# Patient Record
Sex: FEMALE | Race: WHITE | Employment: OTHER | URBAN - METROPOLITAN AREA
[De-identification: names, ages, dates, MRNs, and addresses within clinical notes are randomized per-mention and may not be internally consistent; named-entity substitution may affect disease eponyms.]

---

## 2022-10-29 ENCOUNTER — APPOINTMENT (OUTPATIENT)
Dept: CT IMAGING | Age: 82
End: 2022-10-29
Payer: COMMERCIAL

## 2022-10-29 ENCOUNTER — HOSPITAL ENCOUNTER (EMERGENCY)
Age: 82
Discharge: HOME | End: 2022-10-29
Payer: COMMERCIAL

## 2022-10-29 VITALS
HEIGHT: 66 IN | BODY MASS INDEX: 26.52 KG/M2 | WEIGHT: 165 LBS | OXYGEN SATURATION: 97 % | TEMPERATURE: 98.9 F | DIASTOLIC BLOOD PRESSURE: 78 MMHG | RESPIRATION RATE: 16 BRPM | HEART RATE: 74 BPM | SYSTOLIC BLOOD PRESSURE: 120 MMHG

## 2022-10-29 DIAGNOSIS — S01.81XA FACIAL LACERATION, INITIAL ENCOUNTER: Primary | ICD-10-CM

## 2022-10-29 DIAGNOSIS — S09.90XA INJURY OF HEAD, INITIAL ENCOUNTER: ICD-10-CM

## 2022-10-29 PROCEDURE — 99284 EMERGENCY DEPT VISIT MOD MDM: CPT

## 2022-10-29 PROCEDURE — 6360000002 HC RX W HCPCS

## 2022-10-29 PROCEDURE — 90714 TD VACC NO PRESV 7 YRS+ IM: CPT | Performed by: NURSE PRACTITIONER

## 2022-10-29 PROCEDURE — 12013 RPR F/E/E/N/L/M 2.6-5.0 CM: CPT

## 2022-10-29 PROCEDURE — 70450 CT HEAD/BRAIN W/O DYE: CPT

## 2022-10-29 PROCEDURE — 70486 CT MAXILLOFACIAL W/O DYE: CPT

## 2022-10-29 PROCEDURE — 72125 CT NECK SPINE W/O DYE: CPT

## 2022-10-29 PROCEDURE — 6360000002 HC RX W HCPCS: Performed by: NURSE PRACTITIONER

## 2022-10-29 PROCEDURE — 96372 THER/PROPH/DIAG INJ SC/IM: CPT

## 2022-10-29 PROCEDURE — 90471 IMMUNIZATION ADMIN: CPT | Performed by: NURSE PRACTITIONER

## 2022-10-29 RX ORDER — LIDOCAINE HYDROCHLORIDE 10 MG/ML
INJECTION, SOLUTION EPIDURAL; INFILTRATION; INTRACAUDAL; PERINEURAL
Status: DISCONTINUED
Start: 2022-10-29 | End: 2022-10-30 | Stop reason: HOSPADM

## 2022-10-29 RX ORDER — DIPHENHYDRAMINE HYDROCHLORIDE 50 MG/ML
25 INJECTION INTRAMUSCULAR; INTRAVENOUS ONCE
Status: COMPLETED | OUTPATIENT
Start: 2022-10-29 | End: 2022-10-29

## 2022-10-29 RX ORDER — DIPHENHYDRAMINE HYDROCHLORIDE 50 MG/ML
INJECTION INTRAMUSCULAR; INTRAVENOUS
Status: COMPLETED
Start: 2022-10-29 | End: 2022-10-29

## 2022-10-29 RX ADMIN — CLOSTRIDIUM TETANI TOXOID ANTIGEN (FORMALDEHYDE INACTIVATED) AND CORYNEBACTERIUM DIPHTHERIAE TOXOID ANTIGEN (FORMALDEHYDE INACTIVATED) 0.5 ML: 5; 2 INJECTION, SUSPENSION INTRAMUSCULAR at 20:01

## 2022-10-29 RX ADMIN — DIPHENHYDRAMINE HYDROCHLORIDE 25 MG: 50 INJECTION INTRAMUSCULAR; INTRAVENOUS at 20:03

## 2022-10-29 RX ADMIN — DIPHENHYDRAMINE HYDROCHLORIDE 25 MG: 50 INJECTION, SOLUTION INTRAMUSCULAR; INTRAVENOUS at 20:03

## 2022-10-29 SDOH — ECONOMIC STABILITY: FOOD INSECURITY: WITHIN THE PAST 12 MONTHS, THE FOOD YOU BOUGHT JUST DIDN'T LAST AND YOU DIDN'T HAVE MONEY TO GET MORE.: NEVER TRUE

## 2022-10-29 ASSESSMENT — PAIN SCALES - GENERAL: PAINLEVEL_OUTOF10: 8

## 2022-10-29 ASSESSMENT — PAIN - FUNCTIONAL ASSESSMENT
PAIN_FUNCTIONAL_ASSESSMENT: 0-10
PAIN_FUNCTIONAL_ASSESSMENT: NONE - DENIES PAIN

## 2022-10-29 ASSESSMENT — PAIN DESCRIPTION - LOCATION: LOCATION: HEAD

## 2022-10-29 NOTE — ED PROVIDER NOTES
[facility]        Pt Name: Catia Garg  MRN: [id number]  [place] 1940  Date of evaluation: 10/29/2022  Provider: ARIAN Lacey - ISIDORO  PCP: Fabi Fonseca MD  Note Started: 7:55 PM EDT       [facility]. I have evaluated this patient. My supervising physician was available for consultation. CHIEF COMPLAINT       Chief Complaint   Patient presents with    Fall     Pt arrived via [address] [place] [address] Way home w c/o fall that took place a hour ago. Pt fell in a dining area @ hit her head. Laceration at L eyebrow, R hand       HISTORY OF PRESENT ILLNESS   (Location, Timing/Onset, Context/Setting, Quality, Duration, Modifying Factors, Severity, Associated Signs and Symptoms)  Note limiting factors. Chief Complaint: fall     Catia Garg is a 78 y.o. female who presents to the emergency department for evaluation of fall. The patient is from an [facility] unit and had a mechanical fall. She does have a laceration over the left eye. Skin tears to bilateral forearms. She denies complaint. She is at baseline. She was in the dining area when she fell. She is unable to verbalize the events. Nursing Notes were all reviewed and agreed with or any disagreements were addressed in the HPI. REVIEW OF SYSTEMS    (2-9 systems for level 4, 10 or more for level 5)     Review of Systems   Unable to perform ROS: Dementia   Skin:  Positive for wound. Positives and Pertinent negatives as per HPI. Except as noted above in the ROS, all other systems were reviewed and negative.        PAST MEDICAL HISTORY     Past Medical History:   Diagnosis Date    Acid reflux     Anemia     Bladder prolapse, congenital     Cognitive impairment     Fibromyalgia     Muscle disease or syndrome     Sinusitis, chronic          SURGICAL HISTORY     Past Surgical History:   Procedure Laterality Date COLONOSCOPY  4/17/13    HYSTERECTOMY (CERVIX STATUS UNKNOWN)      SINUS SURGERY      UPPER GASTROINTESTINAL ENDOSCOPY  4/17/13         CURRENTMEDICATIONS       Previous Medications    ACETAMINOPHEN (TYLENOL 8 HOUR PO)    Take  by mouth. BUPROPION (WELLBUTRIN SR) 150 MG EXTENDED RELEASE TABLET    Take 1 tablet by mouth 2 times daily    CLOTRIMAZOLE-BETAMETHASONE (LOTRISONE) 1-0.05 % CREAM    APPLY TO AFFECTED AREA(S) TWICE DAILY    DONEPEZIL (ARICEPT) 10 MG TABLET    Take 1 tablet by mouth nightly    FLUTICASONE (FLONASE) 50 MCG/ACT NASAL SPRAY    2 sprays by Nasal route daily    FLUTICASONE (FLONASE) 50 MCG/ACT NASAL SPRAY    2 sprays by Nasal route daily    LISINOPRIL-HYDROCHLOROTHIAZIDE (PRINZIDE;ZESTORETIC) 10-12.5 MG PER TABLET    Take 1 tablet by mouth daily    PANTOPRAZOLE (PROTONIX) 40 MG TABLET    TAKE ONE TABLET BY MOUTH ONCE A DAY         ALLERGIES     Ceclor [cefaclor] and Erythromycin    FAMILYHISTORY       Family History   Problem Relation Age of Onset    Cancer Mother     Heart Disease Mother     Cancer Father     Heart Disease Father           SOCIAL HISTORY       Social History     Tobacco Use    Smoking status: Never    Smokeless tobacco: Never   Substance Use Topics    Alcohol use: No    Drug use: No       SCREENINGS    Newcomb Coma Scale  Eye Opening: Spontaneous  Best Verbal Response: Confused  Best Motor Response: Obeys commands  Newcomb Coma Scale Score: 14        PHYSICAL EXAM    (up to 7 for level 4, 8 or more for level 5)     ED Triage Vitals [10/29/22 1935]   BP Temp Temp Source Heart Rate Resp SpO2 Height Weight   120/78 98.9 °F (37.2 °C) Oral 74 16 97 % 5' 6\" (1.676 m) 165 lb (74.8 kg)       Physical Exam  Vitals and nursing note reviewed. Constitutional:       Appearance: She is well-developed. She is not diaphoretic. HENT:      Head: Normocephalic. Right Ear: External ear normal.      Left Ear: External ear normal.   Eyes:      General:         Right eye: No discharge. Left eye: No discharge. Neck:      Vascular: No JVD. Cardiovascular:      Rate and Rhythm: Normal rate. Pulmonary:      Effort: Pulmonary effort is normal. No respiratory distress. Abdominal:      Palpations: Abdomen is soft. Musculoskeletal:         General: Normal range of motion. Skin:     General: Skin is warm and dry. Coloration: Skin is not pale. Neurological:      Mental Status: She is alert. Mental status is at baseline. Psychiatric:         Behavior: Behavior normal.       DIAGNOSTIC RESULTS   LABS:    Labs Reviewed - No data to display    When ordered only abnormal lab results are displayed. All other labs were within normal range or not returned as of this dictation. EKG: When ordered, EKG's are interpreted by the Emergency Department Physician in the absence of a cardiologist.  Please see their note for interpretation of EKG. RADIOLOGY:   Non-plain film images such as CT, Ultrasound and MRI are read by the radiologist. Plain radiographic images are visualized and preliminarily interpreted by the ED Provider with the below findings:        Interpretation per the Radiologist below, if available at the time of this note:    CT CERVICAL SPINE WO CONTRAST   Preliminary Result   1. No acute intracranial abnormality. Global atrophy with chronic   periventricular white matter ischemic changes. 2. Left frontal and supraorbital soft tissue swelling. No associated facial   fracture or calvarial abnormality. 3. No evidence of cervical spine fracture. Grade 1 anterolisthesis of C4 on   C5, suspected to be chronic given associated degenerative change. If   clinical concern persists or if patient has persistent neck pain, consider   flexion-extension views. CT HEAD WO CONTRAST   Preliminary Result   1. No acute intracranial abnormality. Global atrophy with chronic   periventricular white matter ischemic changes.    2. Left frontal and supraorbital soft tissue swelling. No associated facial   fracture or calvarial abnormality. 3. No evidence of cervical spine fracture. Grade 1 anterolisthesis of C4 on   C5, suspected to be chronic given associated degenerative change. If   clinical concern persists or if patient has persistent neck pain, consider   flexion-extension views. CT FACIAL BONES WO CONTRAST   Preliminary Result   1. No acute intracranial abnormality. Global atrophy with chronic   periventricular white matter ischemic changes. 2. Left frontal and supraorbital soft tissue swelling. No associated facial   fracture or calvarial abnormality. 3. No evidence of cervical spine fracture. Grade 1 anterolisthesis of C4 on   C5, suspected to be chronic given associated degenerative change. If   clinical concern persists or if patient has persistent neck pain, consider   flexion-extension views. No results found.         PROCEDURES   Unless otherwise noted below, none     Lac Repair    Date/Time: 10/29/2022 7:59 PM  Performed by: ARIAN Keith CNP  Authorized by: ARIAN Keith CNP     Consent:     Consent obtained:  Verbal  Universal protocol:     Procedure explained and questions answered to patient or proxy's satisfaction: yes      Patient identity confirmed:  Verbally with patient  Anesthesia:     Anesthesia method:  Local infiltration    Local anesthetic:  Lidocaine 2% w/o epi  Laceration details:     Location:  Face    Face location:  L eyebrow    Length (cm):  3  Exploration:     Wound exploration: wound explored through full range of motion      Contaminated: no    Treatment:     Area cleansed with:  Chlorhexidine    Amount of cleaning:  Standard    Irrigation solution:  Sterile saline    Visualized foreign bodies/material removed: no    Skin repair:     Repair method:  Sutures    Suture size:  5-0    Suture material:  Nylon    Suture technique:  Simple interrupted    Number of sutures:  4  Approximation: Approximation:  Close  Repair type:     Repair type:  Simple  Post-procedure details:     Dressing:  Open (no dressing)    Procedure completion:  Tolerated    CRITICAL CARE TIME       CONSULTS:  None      EMERGENCY DEPARTMENT COURSE and DIFFERENTIAL DIAGNOSIS/MDM:   Vitals:    Vitals:    10/29/22 1935   BP: 120/78   Pulse: 74   Resp: 16   Temp: 98.9 °F (37.2 °C)   TempSrc: Oral   SpO2: 97%   Weight: 165 lb (74.8 kg)   Height: 5' 6\" (1.676 m)       Patient was given the following medications:  Medications   lidocaine PF 1 % injection (has no administration in time range)   tetanus & diphtheria toxoids (adult) 5-2 LFU injection 0.5 mL (0.5 mLs IntraMUSCular Given 10/29/22 2001)   diphenhydrAMINE (BENADRYL) injection 25 mg (25 mg IntraMUSCular Given 10/29/22 2003)         Is this patient to be included in the SEP-1 Core Measure due to severe sepsis or septic shock? No   Exclusion criteria - the patient is NOT to be included for SEP-1 Core Measure due to: Infection is not suspected    Briefly, this is an 68-year-old patient with history of dementia that presents to the emergency department for evaluation following fall. Laceration was easily repaired by myself. She was given 25 mg of IM Benadryl as she continued to climb out of bed and could not understand why she was being evaluated in the ER. I estimate there is LOW risk for SKULL FRACTURE, INTRACRANIAL HEMORRHAGE, CERVICAL SPINE INJURY, SUBDURAL OR EPIDURAL HEMATOMA,     I completed a structured, evidence-based clinical evaluation to screen for neurologic deficits in this patient. The patient has a normal detailed neurologic exam       CT CERVICAL SPINE WO CONTRAST (Preliminary result)  Result time 10/29/22 21:42:23  Preliminary result by Jennifer Galeas MD (10/29/22 21:42:23)                Impression:    1. No acute intracranial abnormality. Global atrophy with chronic   periventricular white matter ischemic changes.    2. Left frontal and supraorbital soft tissue swelling. No associated facial   fracture or calvarial abnormality. 3. No evidence of cervical spine fracture. Grade 1 anterolisthesis of C4 on   C5, suspected to be chronic given associated degenerative change. If   clinical concern persists or if patient has persistent neck pain, consider   flexion-extension views. I feel the patient can be safely discharged to home with outpatient follow up. Instructions have been given for the patient to return if there is any significant worsening of the headache or the development of confusion, vision change, weakness, numbness, difficulty with speech or walking, or any other concerns     FINAL IMPRESSION      1. Facial laceration, initial encounter    2. Injury of head, initial encounter          DISPOSITION/PLAN   DISPOSITION Decision To Discharge 10/29/2022 09:42:44 PM      PATIENT REFERRED TO:  Zahraa Rojas MD  [address] [place] [address].   [address] [place] [id number]  [phone]    Schedule an appointment as soon as possible for a visit in 1 week  For suture removal    DISCHARGE MEDICATIONS:  New Prescriptions    No medications on file       DISCONTINUED MEDICATIONS:  Discontinued Medications    No medications on file              (Please note that portions of this note were completed with a voice recognition program.  Efforts were made to edit the dictations but occasionally words are mis-transcribed.)    ARIAN Polo CNP (electronically signed)            ARIAN Polo CNP  10/29/22 [id number]